# Patient Record
Sex: MALE | Race: OTHER | HISPANIC OR LATINO | URBAN - METROPOLITAN AREA
[De-identification: names, ages, dates, MRNs, and addresses within clinical notes are randomized per-mention and may not be internally consistent; named-entity substitution may affect disease eponyms.]

---

## 2024-08-14 ENCOUNTER — EMERGENCY (EMERGENCY)
Facility: HOSPITAL | Age: 24
LOS: 1 days | Discharge: HOME | End: 2024-08-14
Attending: STUDENT IN AN ORGANIZED HEALTH CARE EDUCATION/TRAINING PROGRAM
Payer: MEDICAID

## 2024-08-14 VITALS
WEIGHT: 199.96 LBS | TEMPERATURE: 98 F | OXYGEN SATURATION: 98 % | DIASTOLIC BLOOD PRESSURE: 72 MMHG | SYSTOLIC BLOOD PRESSURE: 122 MMHG | RESPIRATION RATE: 18 BRPM | HEART RATE: 74 BPM | HEIGHT: 64 IN

## 2024-08-14 PROCEDURE — 99284 EMERGENCY DEPT VISIT MOD MDM: CPT

## 2024-08-14 PROCEDURE — 99283 EMERGENCY DEPT VISIT LOW MDM: CPT

## 2024-08-14 RX ORDER — IBUPROFEN 200 MG
600 TABLET ORAL ONCE
Refills: 0 | Status: COMPLETED | OUTPATIENT
Start: 2024-08-14 | End: 2024-08-14

## 2024-08-14 RX ORDER — PSEUDOEPHEDRINE HCL 30 MG/1
30 TABLET, FILM COATED ORAL ONCE
Refills: 0 | Status: COMPLETED | OUTPATIENT
Start: 2024-08-14 | End: 2024-08-14

## 2024-08-14 RX ORDER — PSEUDOEPHEDRINE HCL 30 MG/1
1 TABLET, FILM COATED ORAL
Qty: 20 | Refills: 0
Start: 2024-08-14

## 2024-08-14 RX ORDER — IBUPROFEN 200 MG
1 TABLET ORAL
Qty: 20 | Refills: 0
Start: 2024-08-14

## 2024-08-14 RX ADMIN — PSEUDOEPHEDRINE HCL 30 MILLIGRAM(S): 30 TABLET, FILM COATED ORAL at 10:18

## 2024-08-14 RX ADMIN — Medication 600 MILLIGRAM(S): at 10:18

## 2024-08-14 NOTE — ED PROVIDER NOTE - NSFOLLOWUPCLINICS_GEN_ALL_ED_FT
[facility]  Otolaryngology (ENT)  [address], [address]  [place] [address]  Phone: [phone]  Fax:     [facility] - ENT  Otolaryngology (ENT)  [address] [place] [address]  Phone: [phone]  Fax:     [facility]  Otolaryngology (ENT)  [address], [facility] - 10th [place] [address]  Phone: [phone]  Fax:

## 2024-08-14 NOTE — ED PROVIDER NOTE - PHYSICAL EXAMINATION
CONSTITUTIONAL: non-toxic, well appearing  SKIN: no rash, no petechiae.  EYES: PERRL, EOMI, pink conjunctiva, anicteric  ENT: tongue and uvular midline, no exudates, moist mucous membranes, TM intact bilaterally with effusion, no erythema  NECK: Supple; no meningismus, no JVD  CARD: RRR, no murmurs, equal radial pulses bilaterally 2+  RESP: CTAB, no respiratory distress  ABD: Soft, non-tender, non-distended, no peritoneal signs, no CVA tenderness  EXT: Normal ROM x4. No edema.   NEURO: Alert, oriented. Neuro exam nonfocal.  PSYCH: Cooperative, appropriate.

## 2024-08-14 NOTE — ED ADULT NURSE NOTE - OBJECTIVE STATEMENT
Dale #[id number] (): Patient presented to ED with bilateral ear pain and headache x 8 days, associated with fever. Denies any discharges from the ears, dizziness.

## 2024-08-14 NOTE — ED PROVIDER NOTE - OBJECTIVE STATEMENT
#[id number]    24-year-old male with past medical history of seizure disorder on lamotrigine presents with congestion x 8 days.  Patient reports bilateral ear pressure, muffled hearing, headache, nasal congestion, sore throat, dry cough over the past 8 days.  Patient states he initially had fevers and vomiting which resolved.  Patient states his spouse was also sick with similar symptoms.  Patient reports taking leftover antibiotic from [place] with little improvement.  Denies any ear injury or trauma.  Denies any drooling, voice change, chest pain, shortness of breath, abdominal pain, diarrhea, dysuria, numbness, focal weakness, or rash.  Denies sudden onset headache or worst headache of his life.  Denies any additional complaints.

## 2024-08-14 NOTE — ED PROVIDER NOTE - PATIENT PORTAL LINK FT
You can access the FollowMyHealth Patient Portal offered by [facility] by registering at the following website: [email or web address]. By joining [facility]’s FollowMyHealth portal, you will also be able to view your health information using other applications (apps) compatible with our system.

## 2024-08-14 NOTE — ED ADULT NURSE NOTE - DOES PATIENT HAVE ADVANCE DIRECTIVE
Called and left message for patient to let them know we were calling them to let them know Dr Jewell left and to see if they have a new PCP and to see if they needed anything. Working [facility] PCP list.      
No

## 2024-08-14 NOTE — ED PROVIDER NOTE - CLINICAL SUMMARY MEDICAL DECISION MAKING FREE TEXT BOX
Xavier: 24-year-old male with past medical history of seizure disorder on lamotrigine presents with congestion x 8 days.  Patient reports bilateral ear pressure, muffled hearing, headache, nasal congestion, sore throat, dry cough over the past 8 days.  Patient states he initially had fevers and vomiting which resolved.  Patient states his spouse was also sick with similar symptoms.  Patient reports taking leftover antibiotic from [place] with little improvement.  Denies any ear injury or trauma.  Denies any drooling, voice change, chest pain, shortness of breath, abdominal pain, diarrhea, dysuria, numbness, focal weakness, or rash.  Denies sudden onset headache or worst headache of his life.  Physical exam per above. Likely eustachian tube dysfunction in setting of URI symptoms. Patient well appearing in NAD. Will provide supportive treatment, discussed PRN ENT follow up/return precautions, pt understood and agreeable with plan.

## 2024-08-14 NOTE — ED PROVIDER NOTE - NSFOLLOWUPINSTRUCTIONS_ED_ALL_ED_FT
Dolor de oído en los adultos  Earache, Adult    Un dolor de oído puede deberse a muchas causas, que incluyen lo siguiente:    Vonnie infección.  Acumulación de cerumen.  Presión en el oído.  Algo en el oído que no debería estar ahí (cuerpo extraño).  Dolor de garganta.  Problemas dentales.  Problemas en la mandíbula.    El tratamiento del dolor de oído dependerá de la causa. Si la causa no está halle o no se puede determinar, deberá observar los síntomas hasta que el dolor de oído desaparezca o hasta que se descubra la causa.    Siga estas instrucciones en stafford casa:      Medicamentos    Scott o aplíquese los medicamentos de venta david y los recetados solamente devan se lo haya indicado el médico.  Si le recetaron un antibiótico, úselo devan se lo haya indicado el médico. No deje de usar el antibiótico aunque comience a sentirse mejor.  No se ponga nada en el oído que no latanya los medicamentos que el médico le recetó.        Control del dolor    Si se lo indican, aplique calor en la gabino afectada con la frecuencia que le haya indicado el médico. Use la hillary de calor que el médico le recomiende, devan vonnie compresa de calor húmedo o vonnie almohadilla térmica.    Coloque vonnie toalla entre la piel y la hillary de calor.  Aplique calor melissa 20 a 30 minutos.  Retire la hillary de calor si la piel se pone de color cuellar brillante. Pettit es especialmente importante si no puede sentir dolor, calor o frío. Puede correr un riesgo mayor de sufrir quemaduras.    Si se lo indican, aplique hielo en la gabino afectada con la frecuencia que le haya indicado el médico. Para hacer esto:      Ponga el hielo en vonnie bolsa plástica.  Coloque vonnie toalla entre la piel y la bolsa.  Coloque el hielo melissa 20 minutos, 2 a 3 veces por día.        Instrucciones generales    Esté atenta a cualquier cambio en los síntomas.  Intente descansar en posición erguida, en lugar de recostarse. Pettit puede ayudar a reducir la presión en el oído y aliviar el dolor.  Mastique goma de mascar si esto ayuda a aliviar el dolor de oídos.  Trate cualquier alergia devan se lo haya indicado el médico.  Alondra suficiente líquido devan para mantener la orina de color amarillo pálido.  Es stafford responsabilidad obtener los resultados de cualquier prueba que se haya realizado. Consulte al médico o pregunte en el departamento donde se realizan las pruebas cuándo estarán listos los resultados.  Concurra a todas las visitas de seguimiento devan se lo haya indicado el médico. Pettit es importante.    Comuníquese con un médico si:  El dolor no mejora en el término de 2 días.  El dolor de oído empeora.  Aparecen nuevos síntomas.  Tiene fiebre.    Busque ayuda de inmediato si:  Tiene un dolor de kristopher intenso.  Tiene rigidez en el loren.  Tiene dificultad para tragar.  Tiene enrojecimiento o hinchazón detrás de la oreja.  Le sale phong o líquido del oído.  Pierde la audición.  Se siente mareado.    Resumen  El dolor de oído puede deberse a muchas causas.  El tratamiento del dolor de oído dependerá de la causa. Siga las recomendaciones del médico para tratar stafford dolor de oído.  Si la causa no está halle o no se puede determinar, deberá observar los síntomas hasta que el dolor de oído desaparezca o hasta que se descubra la causa.  Concurra a todas las visitas de seguimiento devan se lo haya indicado el médico. Pettit es importante.

## 2024-11-21 NOTE — ED ADULT NURSE NOTE - CHPI ED NUR SYMPTOMS POS
Continue to alternate acetaminophen and ibuprofen as needed for fever.    Strep is negative.    Follow-up with primary care provider if symptoms or not improving in the next 1 to 2 days.   headache/EAR PAIN

## 2025-01-29 ENCOUNTER — EMERGENCY (EMERGENCY)
Facility: HOSPITAL | Age: 25
LOS: 1 days | Discharge: HOME | End: 2025-01-29
Attending: EMERGENCY MEDICINE
Payer: COMMERCIAL

## 2025-01-29 VITALS
SYSTOLIC BLOOD PRESSURE: 144 MMHG | DIASTOLIC BLOOD PRESSURE: 80 MMHG | OXYGEN SATURATION: 99 % | RESPIRATION RATE: 16 BRPM | HEART RATE: 88 BPM | TEMPERATURE: 98 F | WEIGHT: 190.04 LBS

## 2025-01-29 LAB
ALBUMIN SERPL ELPH-MCNC: 4.1 G/DL — SIGNIFICANT CHANGE UP (ref 3.5–5)
ALP SERPL-CCNC: 72 U/L — SIGNIFICANT CHANGE UP (ref 40–120)
ALT FLD-CCNC: 37 U/L DA — SIGNIFICANT CHANGE UP (ref 10–60)
ANION GAP SERPL CALC-SCNC: 8 MMOL/L — SIGNIFICANT CHANGE UP (ref 5–17)
AST SERPL-CCNC: 19 U/L — SIGNIFICANT CHANGE UP (ref 10–40)
BASOPHILS # BLD AUTO: 0.02 K/UL — SIGNIFICANT CHANGE UP (ref 0–0.2)
BASOPHILS NFR BLD AUTO: 0.2 % — SIGNIFICANT CHANGE UP (ref 0–2)
BILIRUB SERPL-MCNC: 0.5 MG/DL — SIGNIFICANT CHANGE UP (ref 0.2–1.2)
BUN SERPL-MCNC: 10 MG/DL — SIGNIFICANT CHANGE UP (ref 7–18)
CALCIUM SERPL-MCNC: 8.9 MG/DL — SIGNIFICANT CHANGE UP (ref 8.4–10.5)
CHLORIDE SERPL-SCNC: 106 MMOL/L — SIGNIFICANT CHANGE UP (ref 96–108)
CO2 SERPL-SCNC: 25 MMOL/L — SIGNIFICANT CHANGE UP (ref 22–31)
CREAT SERPL-MCNC: 1.08 MG/DL — SIGNIFICANT CHANGE UP (ref 0.5–1.3)
EGFR: 97 ML/MIN/1.73M2 — SIGNIFICANT CHANGE UP
EOSINOPHIL # BLD AUTO: 0.63 K/UL — HIGH (ref 0–0.5)
EOSINOPHIL NFR BLD AUTO: 6.6 % — HIGH (ref 0–6)
FLUAV AG NPH QL: SIGNIFICANT CHANGE UP
FLUBV AG NPH QL: SIGNIFICANT CHANGE UP
GLUCOSE SERPL-MCNC: 91 MG/DL — SIGNIFICANT CHANGE UP (ref 70–99)
HCT VFR BLD CALC: 43.5 % — SIGNIFICANT CHANGE UP (ref 39–50)
HGB BLD-MCNC: 14.4 G/DL — SIGNIFICANT CHANGE UP (ref 13–17)
IMM GRANULOCYTES NFR BLD AUTO: 0.1 % — SIGNIFICANT CHANGE UP (ref 0–0.9)
LYMPHOCYTES # BLD AUTO: 2.2 K/UL — SIGNIFICANT CHANGE UP (ref 1–3.3)
LYMPHOCYTES # BLD AUTO: 23.1 % — SIGNIFICANT CHANGE UP (ref 13–44)
MAGNESIUM SERPL-MCNC: 2.2 MG/DL — SIGNIFICANT CHANGE UP (ref 1.6–2.6)
MCHC RBC-ENTMCNC: 26.5 PG — LOW (ref 27–34)
MCHC RBC-ENTMCNC: 33.1 G/DL — SIGNIFICANT CHANGE UP (ref 32–36)
MCV RBC AUTO: 80.1 FL — SIGNIFICANT CHANGE UP (ref 80–100)
MONOCYTES # BLD AUTO: 0.68 K/UL — SIGNIFICANT CHANGE UP (ref 0–0.9)
MONOCYTES NFR BLD AUTO: 7.2 % — SIGNIFICANT CHANGE UP (ref 2–14)
NEUTROPHILS # BLD AUTO: 5.97 K/UL — SIGNIFICANT CHANGE UP (ref 1.8–7.4)
NEUTROPHILS NFR BLD AUTO: 62.8 % — SIGNIFICANT CHANGE UP (ref 43–77)
NRBC # BLD: 0 /100 WBCS — SIGNIFICANT CHANGE UP (ref 0–0)
PLATELET # BLD AUTO: 304 K/UL — SIGNIFICANT CHANGE UP (ref 150–400)
POTASSIUM SERPL-MCNC: 4 MMOL/L — SIGNIFICANT CHANGE UP (ref 3.5–5.3)
POTASSIUM SERPL-SCNC: 4 MMOL/L — SIGNIFICANT CHANGE UP (ref 3.5–5.3)
PROT SERPL-MCNC: 7.8 G/DL — SIGNIFICANT CHANGE UP (ref 6–8.3)
RBC # BLD: 5.43 M/UL — SIGNIFICANT CHANGE UP (ref 4.2–5.8)
RBC # FLD: 13.3 % — SIGNIFICANT CHANGE UP (ref 10.3–14.5)
RSV RNA NPH QL NAA+NON-PROBE: SIGNIFICANT CHANGE UP
SARS-COV-2 RNA SPEC QL NAA+PROBE: SIGNIFICANT CHANGE UP
SODIUM SERPL-SCNC: 139 MMOL/L — SIGNIFICANT CHANGE UP (ref 135–145)
TROPONIN I, HIGH SENSITIVITY RESULT: 3.6 NG/L — SIGNIFICANT CHANGE UP
WBC # BLD: 9.51 K/UL — SIGNIFICANT CHANGE UP (ref 3.8–10.5)
WBC # FLD AUTO: 9.51 K/UL — SIGNIFICANT CHANGE UP (ref 3.8–10.5)

## 2025-01-29 PROCEDURE — 93005 ELECTROCARDIOGRAM TRACING: CPT

## 2025-01-29 PROCEDURE — 71046 X-RAY EXAM CHEST 2 VIEWS: CPT

## 2025-01-29 PROCEDURE — 84484 ASSAY OF TROPONIN QUANT: CPT

## 2025-01-29 PROCEDURE — 87637 SARSCOV2&INF A&B&RSV AMP PRB: CPT

## 2025-01-29 PROCEDURE — 83735 ASSAY OF MAGNESIUM: CPT

## 2025-01-29 PROCEDURE — 85025 COMPLETE CBC W/AUTO DIFF WBC: CPT

## 2025-01-29 PROCEDURE — 99285 EMERGENCY DEPT VISIT HI MDM: CPT

## 2025-01-29 PROCEDURE — 99285 EMERGENCY DEPT VISIT HI MDM: CPT | Mod: 25

## 2025-01-29 PROCEDURE — 80053 COMPREHEN METABOLIC PANEL: CPT

## 2025-01-29 PROCEDURE — 96374 THER/PROPH/DIAG INJ IV PUSH: CPT

## 2025-01-29 PROCEDURE — 36415 COLL VENOUS BLD VENIPUNCTURE: CPT

## 2025-01-29 PROCEDURE — 71046 X-RAY EXAM CHEST 2 VIEWS: CPT | Mod: 26

## 2025-01-29 RX ORDER — KETOROLAC TROMETHAMINE 30 MG/ML
15 INJECTION INTRAMUSCULAR; INTRAVENOUS ONCE
Refills: 0 | Status: DISCONTINUED | OUTPATIENT
Start: 2025-01-29 | End: 2025-01-29

## 2025-01-29 RX ADMIN — KETOROLAC TROMETHAMINE 15 MILLIGRAM(S): 30 INJECTION INTRAMUSCULAR; INTRAVENOUS at 12:19

## 2025-01-29 RX ADMIN — KETOROLAC TROMETHAMINE 15 MILLIGRAM(S): 30 INJECTION INTRAMUSCULAR; INTRAVENOUS at 14:21

## 2025-01-29 NOTE — ED PROVIDER NOTE - NSFOLLOWUPINSTRUCTIONS_ED_ALL_ED_FT
You were seen in the emergency department for body aches, productive cough, ear pain and intermittent chest pain.  Your labs are largely unremarkable and your chest xray was normal.   You likely have a viral illness.  Please stay well-hydrated, you may take 975 mg of Tylenol and/or 600 mg of Motrin every 6-8 hours as needed for pain and fever.      Return immediately to the emergency department if chest pain, shortness of breath, inability eat or drink, numbness or any other concerning symptom    Lo atendieron en el departamento de emergencias por rangel corporales, tos productiva, dolor de oído y dolor de pecho intermitente.  Michelle análisis de laboratorio son prácticamente normales y stafford radiografía de tórax fue normal.   Probablemente tengas vonnie enfermedad viral.  Manténgase ana hidratado; puede ellen 975 mg de Tylenol y/o 600 mg de Motrin cada 6 a 8 horas según sea necesario para el dolor y la fiebre.      Regrese inmediatamente al departamento de emergencias si tiene dolor en el pecho, dificultad para respirar, incapacidad para comer o beber, entumecimiento o cualquier otro síntoma preocupante.    Tos en los adultos  Cough, Adult  La tos es un reflejo que despeja la garganta y las vías respiratorias (sistema respiratorio). Ayuda a curar y proteger los pulmones. Es normal toser de vez en cuando. Si la tos aparece junto con otros síntomas o dura mucho tiempo, puede indicar vonnie afección que necesita tratamiento. Vonnie tos de corto plazo (aguda) puede durar de 2 a 3 semanas solamente. Vonnie tos prolongada (crónica) puede durar 8 semanas o más tiempo.    Las causas de la tos suelen ser las siguientes:  Enfermedades, devan:  Vonnie infección del sistema respiratorio.  Asma u otras enfermedades cardíacas o pulmonares.  Reflujo gastroesofágico. Herculaneum ocurre cuando el ácido asciende desde el estómago.  Inhalación de cosas que irritan los pulmones.  Alergias.  Goteo posnasal. Se produce cuando la mucosidad baja por la parte posterior de la garganta.  Fumar.  Algunos medicamentos.  Siga estas indicaciones en stafford casa:  Medicamentos    Use los medicamentos de venta david y los recetados solamente devan se lo haya indicado el médico.  Hable con el médico antes de ellen medicamentos para la tos (antitusivos).  Comida y bebida    No alondra alcohol.  Evite la cafeína.  Alondra suficiente líquido para mantener el pis (la orina) de color amarillo pálido.  Estilo de hedy    Evite el humo del cigarrillo.  No consuma ningún producto que contenga nicotina o tabaco. Estos productos incluyen cigarrillos, tabaco para mascar y aparatos de vapeo, devan los cigarrillos electrónicos. Si necesita ayuda para dejar de consumir estos productos, consulte al médico.  Evite las cosas que lo hacen toser. Estas pueden incluir perfumes, eder, productos de limpieza o humo de fogatas.  Indicaciones generales    A person holding a cloth over the mouth and nose while coughing.  Fíjese si hay algún cambio en la tos. De ser así, infórmeselo al médico.  Siempre cúbrase la boca al toser.  Si el aire es seco en stafford habitación o en stafford casa, use un humidificador o un vaporizador de alyse fría.  Si la tos empeora por la noche, pruebe a dormir en posición semierguida.  Descanse todo lo que sea necesario.  Comuníquese con un médico si:  Tiene nuevos síntomas o estos empeoran.  Tose y escupe pus.  Tiene fiebre que no desaparece o tos que no mejora después de 2 o 3 semanas.  No es posible controlar la tos con medicamentos y no puede dormir.  Siente que el dolor empeora o no se nikunj con los medicamentos.  Pierde peso sin ningún motivo issac.  Transpira melissa la noche.  Solicite ayuda de inmediato si:  Tose y escupe phong.  Tiene dificultad para respirar.  Stafford corazón late muy rápidamente.  Estos síntomas pueden indicar vonnie emergencia. Solicite ayuda de inmediato. Llame al 911.  No espere a estephania si los síntomas desaparecen.  No conduzca por michelle propios medios hasta el hospital.  Esta información no tiene devan fin reemplazar el consejo del médico. Asegúrese de hacerle al médico cualquier pregunta que tenga.

## 2025-01-29 NOTE — ED PROVIDER NOTE - ATTENDING APP SHARED VISIT CONTRIBUTION OF CARE
Patient presenting with bodyaches cough and nonexertional chest pains.  EKG without any ischemic changes on my interpretation, may have LVH.  Flu COVID testing from triage negative.  Will evaluate for possible pneumonia with labs and x-ray and treat symptomatically

## 2025-01-29 NOTE — ED PROVIDER NOTE - CLINICAL SUMMARY MEDICAL DECISION MAKING FREE TEXT BOX
25-year-old male PMH seizure disorder presenting to emergency department for body aches, bilateral ear pain, productive cough.  Also notes intermittent mid sternal chest pain no exacerbating or remitting factors since December for which he has been seeing cards.  No history of DVT or PE.  PE as above, no e/o OE/OM. low ACS risk factors, PERC negative, do not suspect pericarditis, will eval for pna, acs, eletrolyte abnormality, likely viral syndrome, reassess dispo 25-year-old male PMH seizure disorder presenting to emergency department for body aches, bilateral ear pain, productive cough.  Also notes intermittent mid sternal chest pain no exacerbating or remitting factors since December for which he has been seeing cards.  No history of DVT or PE.  PE as above, no e/o OE/OM. low ACS risk factors, PERC negative, do not suspect pericarditis, will eval for pna, acs, electrolyte abnormality, likely viral syndrome, reassess dispo

## 2025-01-29 NOTE — ED PROVIDER NOTE - PROGRESS NOTE DETAILS
Labs are nonactionable chest x-ray without infiltrate EKG with LVH,  patient states improvement in pain, will DC with supportive care, red flag signs and symptoms discussed as well as strict return precautions given patient verbalized understanding through  [id number]

## 2025-01-29 NOTE — ED PROVIDER NOTE - PATIENT PORTAL LINK FT
You can access the FollowMyHealth Patient Portal offered by [facility] by registering at the following website: [email or web address]. By joining [facility]’s FollowMyHealth portal, you will also be able to view your health information using other applications (apps) compatible with our system.

## 2025-01-29 NOTE — ED PROVIDER NOTE - PHYSICAL EXAMINATION
Gen: NAD, AOx3, able to make needs known, non-toxic  Head: NCAT  HEENT: EOMI, oral mucosa moist, normal conjunctiva No lymphadenopathy. Ears:  NO pinna or tragus redness, swelling or tenderness. No ear canal erythema, rash or tenderness. No otorrhea, hemotympanum, TM perforation or rash.  TM has grayish translucent appearance with visible landmarks. Positive light reflex.   Lung: CTAB, no respiratory distress, no wheezes/rhonchi/rales B/L, speaking in full sentences  CV: RRR, no murmurs  Abd: non distended, soft, nontender, no guarding, no CVA tenderness  MSK: no visible deformities  Neuro: Appears non focal  Skin: Warm, well perfused, no rash  Psych: normal affect

## 2025-01-29 NOTE — ED ADULT NURSE NOTE - NSFALLUNIVINTERV_ED_ALL_ED
Bed/Stretcher in lowest position, wheels locked, appropriate side rails in place/Call bell, personal items and telephone in reach/Instruct patient to call for assistance before getting out of bed/chair/stretcher/Non-slip footwear applied when patient is off stretcher/[place] to call system/Physically safe environment - no spills, clutter or unnecessary equipment/Purposeful proactive rounding/Room/bathroom lighting operational, light cord in reach

## 2025-01-29 NOTE — ED ADULT NURSE NOTE - OBJECTIVE STATEMENT
Patient c/o generalized body pain/aches and chest discomfort x1 week.  Pt denies any sob, dizziness, nausea, diarrhea or fever/chills.

## 2025-01-29 NOTE — ED PROVIDER NOTE - OBJECTIVE STATEMENT
25-year-old male PMH seizure disorder presenting to emergency department for body aches, productive cough, bilateral ear pain and nonradiating midsternal chest pain associated with sob.  Patient has had bodyaches for the last week, bilateral ear pain for the last 2 days and intermittent chest pain since December.  He has seen cardiology last week but has not received results of testing done in office.  Of note spouse  has same symptoms.   Chest pain and associated shortness of breath has been improved.  Denies abdominal pain, nausea, vomiting, diarrhea, fever, urinary symptoms, visual change, numbness, difficulty ambulating, other complaint.

## 2025-02-26 ENCOUNTER — EMERGENCY (EMERGENCY)
Facility: HOSPITAL | Age: 25
LOS: 1 days | Discharge: HOME | End: 2025-02-26
Attending: EMERGENCY MEDICINE
Payer: MEDICAID

## 2025-02-26 VITALS
SYSTOLIC BLOOD PRESSURE: 129 MMHG | RESPIRATION RATE: 18 BRPM | HEIGHT: 67 IN | HEART RATE: 61 BPM | WEIGHT: 194.01 LBS | TEMPERATURE: 97 F | DIASTOLIC BLOOD PRESSURE: 80 MMHG

## 2025-02-26 LAB
ALBUMIN SERPL ELPH-MCNC: 3.9 G/DL — SIGNIFICANT CHANGE UP (ref 3.5–5)
ALP SERPL-CCNC: 70 U/L — SIGNIFICANT CHANGE UP (ref 40–120)
ALT FLD-CCNC: 37 U/L DA — SIGNIFICANT CHANGE UP (ref 10–60)
ANION GAP SERPL CALC-SCNC: 7 MMOL/L — SIGNIFICANT CHANGE UP (ref 5–17)
APPEARANCE UR: CLEAR — SIGNIFICANT CHANGE UP
AST SERPL-CCNC: 18 U/L — SIGNIFICANT CHANGE UP (ref 10–40)
BACTERIA # UR AUTO: ABNORMAL /HPF
BASOPHILS # BLD AUTO: 0.04 K/UL — SIGNIFICANT CHANGE UP (ref 0–0.2)
BASOPHILS NFR BLD AUTO: 0.5 % — SIGNIFICANT CHANGE UP (ref 0–2)
BILIRUB SERPL-MCNC: 0.6 MG/DL — SIGNIFICANT CHANGE UP (ref 0.2–1.2)
BILIRUB UR-MCNC: NEGATIVE — SIGNIFICANT CHANGE UP
BUN SERPL-MCNC: 10 MG/DL — SIGNIFICANT CHANGE UP (ref 7–18)
CALCIUM SERPL-MCNC: 9.1 MG/DL — SIGNIFICANT CHANGE UP (ref 8.4–10.5)
CHLORIDE SERPL-SCNC: 107 MMOL/L — SIGNIFICANT CHANGE UP (ref 96–108)
CO2 SERPL-SCNC: 27 MMOL/L — SIGNIFICANT CHANGE UP (ref 22–31)
COLOR SPEC: YELLOW — SIGNIFICANT CHANGE UP
CREAT SERPL-MCNC: 1.39 MG/DL — HIGH (ref 0.5–1.3)
DIFF PNL FLD: NEGATIVE — SIGNIFICANT CHANGE UP
EGFR: 72 ML/MIN/1.73M2 — SIGNIFICANT CHANGE UP
EGFR: 72 ML/MIN/1.73M2 — SIGNIFICANT CHANGE UP
EOSINOPHIL # BLD AUTO: 0.18 K/UL — SIGNIFICANT CHANGE UP (ref 0–0.5)
EOSINOPHIL NFR BLD AUTO: 2 % — SIGNIFICANT CHANGE UP (ref 0–6)
EPI CELLS # UR: PRESENT
GLUCOSE SERPL-MCNC: 91 MG/DL — SIGNIFICANT CHANGE UP (ref 70–99)
GLUCOSE UR QL: NEGATIVE MG/DL — SIGNIFICANT CHANGE UP
HCT VFR BLD CALC: 42.1 % — SIGNIFICANT CHANGE UP (ref 39–50)
HGB BLD-MCNC: 13.7 G/DL — SIGNIFICANT CHANGE UP (ref 13–17)
HIV 1 & 2 AB SERPL IA.RAPID: SIGNIFICANT CHANGE UP
IMM GRANULOCYTES NFR BLD AUTO: 0.2 % — SIGNIFICANT CHANGE UP (ref 0–0.9)
KETONES UR-MCNC: NEGATIVE MG/DL — SIGNIFICANT CHANGE UP
LEUKOCYTE ESTERASE UR-ACNC: NEGATIVE — SIGNIFICANT CHANGE UP
LIDOCAIN IGE QN: 35 U/L — SIGNIFICANT CHANGE UP (ref 13–75)
LYMPHOCYTES # BLD AUTO: 2.37 K/UL — SIGNIFICANT CHANGE UP (ref 1–3.3)
LYMPHOCYTES # BLD AUTO: 26.8 % — SIGNIFICANT CHANGE UP (ref 13–44)
MCHC RBC-ENTMCNC: 27 PG — SIGNIFICANT CHANGE UP (ref 27–34)
MCHC RBC-ENTMCNC: 32.5 G/DL — SIGNIFICANT CHANGE UP (ref 32–36)
MCV RBC AUTO: 82.9 FL — SIGNIFICANT CHANGE UP (ref 80–100)
MONOCYTES # BLD AUTO: 0.67 K/UL — SIGNIFICANT CHANGE UP (ref 0–0.9)
MONOCYTES NFR BLD AUTO: 7.6 % — SIGNIFICANT CHANGE UP (ref 2–14)
NEUTROPHILS # BLD AUTO: 5.57 K/UL — SIGNIFICANT CHANGE UP (ref 1.8–7.4)
NEUTROPHILS NFR BLD AUTO: 62.9 % — SIGNIFICANT CHANGE UP (ref 43–77)
NITRITE UR-MCNC: NEGATIVE — SIGNIFICANT CHANGE UP
NRBC BLD AUTO-RTO: 0 /100 WBCS — SIGNIFICANT CHANGE UP (ref 0–0)
PH UR: 5.5 — SIGNIFICANT CHANGE UP (ref 5–8)
PLATELET # BLD AUTO: 288 K/UL — SIGNIFICANT CHANGE UP (ref 150–400)
POTASSIUM SERPL-MCNC: 3.8 MMOL/L — SIGNIFICANT CHANGE UP (ref 3.5–5.3)
POTASSIUM SERPL-SCNC: 3.8 MMOL/L — SIGNIFICANT CHANGE UP (ref 3.5–5.3)
PROT SERPL-MCNC: 7.6 G/DL — SIGNIFICANT CHANGE UP (ref 6–8.3)
PROT UR-MCNC: ABNORMAL MG/DL
RBC # BLD: 5.08 M/UL — SIGNIFICANT CHANGE UP (ref 4.2–5.8)
RBC # FLD: 12.7 % — SIGNIFICANT CHANGE UP (ref 10.3–14.5)
RBC CASTS # UR COMP ASSIST: 2 /HPF — SIGNIFICANT CHANGE UP (ref 0–4)
SODIUM SERPL-SCNC: 141 MMOL/L — SIGNIFICANT CHANGE UP (ref 135–145)
SP GR SPEC: 1.01 — SIGNIFICANT CHANGE UP (ref 1–1.03)
UROBILINOGEN FLD QL: 0.2 MG/DL — SIGNIFICANT CHANGE UP (ref 0.2–1)
WBC # BLD: 8.85 K/UL — SIGNIFICANT CHANGE UP (ref 3.8–10.5)
WBC # FLD AUTO: 8.85 K/UL — SIGNIFICANT CHANGE UP (ref 3.8–10.5)
WBC UR QL: 2 /HPF — SIGNIFICANT CHANGE UP (ref 0–5)

## 2025-02-26 PROCEDURE — 81001 URINALYSIS AUTO W/SCOPE: CPT

## 2025-02-26 PROCEDURE — 80053 COMPREHEN METABOLIC PANEL: CPT

## 2025-02-26 PROCEDURE — 83690 ASSAY OF LIPASE: CPT

## 2025-02-26 PROCEDURE — 86703 HIV-1/HIV-2 1 RESULT ANTBDY: CPT

## 2025-02-26 PROCEDURE — 85025 COMPLETE CBC W/AUTO DIFF WBC: CPT

## 2025-02-26 PROCEDURE — 96374 THER/PROPH/DIAG INJ IV PUSH: CPT

## 2025-02-26 PROCEDURE — 99284 EMERGENCY DEPT VISIT MOD MDM: CPT

## 2025-02-26 PROCEDURE — 96375 TX/PRO/DX INJ NEW DRUG ADDON: CPT

## 2025-02-26 PROCEDURE — 86803 HEPATITIS C AB TEST: CPT

## 2025-02-26 PROCEDURE — 36415 COLL VENOUS BLD VENIPUNCTURE: CPT

## 2025-02-26 PROCEDURE — 99284 EMERGENCY DEPT VISIT MOD MDM: CPT | Mod: 25

## 2025-02-26 RX ORDER — ONDANSETRON HCL/PF 4 MG/2 ML
1 VIAL (ML) INJECTION
Qty: 28 | Refills: 1
Start: 2025-02-26 | End: 2025-03-11

## 2025-02-26 RX ORDER — ONDANSETRON HCL/PF 4 MG/2 ML
4 VIAL (ML) INJECTION ONCE
Refills: 0 | Status: COMPLETED | OUTPATIENT
Start: 2025-02-26 | End: 2025-02-26

## 2025-02-26 RX ORDER — KETOROLAC TROMETHAMINE 30 MG/ML
15 INJECTION, SOLUTION INTRAMUSCULAR; INTRAVENOUS ONCE
Refills: 0 | Status: DISCONTINUED | OUTPATIENT
Start: 2025-02-26 | End: 2025-02-26

## 2025-02-26 RX ADMIN — Medication 20 MILLIGRAM(S): at 13:49

## 2025-02-26 RX ADMIN — Medication 1000 MILLILITER(S): at 13:44

## 2025-02-26 RX ADMIN — KETOROLAC TROMETHAMINE 15 MILLIGRAM(S): 30 INJECTION, SOLUTION INTRAMUSCULAR; INTRAVENOUS at 13:44

## 2025-02-26 RX ADMIN — Medication 4 MILLIGRAM(S): at 13:44

## 2025-02-27 LAB
HCV AB S/CO SERPL IA: 0.17 S/CO — SIGNIFICANT CHANGE UP (ref 0–0.79)
HCV AB SERPL-IMP: SIGNIFICANT CHANGE UP
